# Patient Record
Sex: MALE | Race: WHITE | NOT HISPANIC OR LATINO | URBAN - METROPOLITAN AREA
[De-identification: names, ages, dates, MRNs, and addresses within clinical notes are randomized per-mention and may not be internally consistent; named-entity substitution may affect disease eponyms.]

---

## 2017-07-27 ENCOUNTER — AMBULATORY - HEALTHEAST (OUTPATIENT)
Dept: CARDIAC REHAB | Facility: HOSPITAL | Age: 62
End: 2017-07-27

## 2017-07-27 DIAGNOSIS — Z95.5 STENTED CORONARY ARTERY: ICD-10-CM

## 2017-08-02 ENCOUNTER — AMBULATORY - HEALTHEAST (OUTPATIENT)
Dept: CARDIAC REHAB | Facility: HOSPITAL | Age: 62
End: 2017-08-02

## 2017-08-02 DIAGNOSIS — Z95.5 STENTED CORONARY ARTERY: ICD-10-CM

## 2017-08-02 RX ORDER — ISOSORBIDE MONONITRATE 30 MG/1
30 TABLET, EXTENDED RELEASE ORAL DAILY
Status: SHIPPED | COMMUNITY
Start: 2017-08-02

## 2017-08-02 RX ORDER — NITROGLYCERIN 0.4 MG/1
0.4 TABLET SUBLINGUAL
Status: SHIPPED | COMMUNITY
Start: 2017-08-02

## 2017-08-02 RX ORDER — ATORVASTATIN CALCIUM 80 MG/1
80 TABLET, FILM COATED ORAL DAILY
Status: SHIPPED | COMMUNITY
Start: 2017-08-02

## 2017-08-02 RX ORDER — ASPIRIN 81 MG/1
81 TABLET, CHEWABLE ORAL DAILY
Status: SHIPPED | COMMUNITY
Start: 2017-08-02

## 2017-08-02 RX ORDER — PRASUGREL 10 MG/1
10 TABLET, FILM COATED ORAL DAILY
Status: SHIPPED | COMMUNITY
Start: 2017-08-02

## 2017-08-02 RX ORDER — METOPROLOL SUCCINATE 50 MG/1
50 TABLET, EXTENDED RELEASE ORAL DAILY
Status: SHIPPED | COMMUNITY
Start: 2017-08-02

## 2017-08-02 ASSESSMENT — MIFFLIN-ST. JEOR: SCORE: 1689.83

## 2017-08-07 ENCOUNTER — AMBULATORY - HEALTHEAST (OUTPATIENT)
Dept: CARDIAC REHAB | Facility: HOSPITAL | Age: 62
End: 2017-08-07

## 2017-08-07 DIAGNOSIS — Z95.5 STENTED CORONARY ARTERY: ICD-10-CM

## 2017-08-09 ENCOUNTER — AMBULATORY - HEALTHEAST (OUTPATIENT)
Dept: CARDIAC REHAB | Facility: HOSPITAL | Age: 62
End: 2017-08-09

## 2017-08-09 DIAGNOSIS — Z95.5 STENTED CORONARY ARTERY: ICD-10-CM

## 2017-08-14 ENCOUNTER — AMBULATORY - HEALTHEAST (OUTPATIENT)
Dept: CARDIAC REHAB | Facility: HOSPITAL | Age: 62
End: 2017-08-14

## 2017-08-14 DIAGNOSIS — Z95.5 STENTED CORONARY ARTERY: ICD-10-CM

## 2017-08-16 ENCOUNTER — AMBULATORY - HEALTHEAST (OUTPATIENT)
Dept: CARDIAC REHAB | Facility: HOSPITAL | Age: 62
End: 2017-08-16

## 2017-08-16 DIAGNOSIS — Z95.5 STENTED CORONARY ARTERY: ICD-10-CM

## 2017-08-21 ENCOUNTER — AMBULATORY - HEALTHEAST (OUTPATIENT)
Dept: CARDIAC REHAB | Facility: HOSPITAL | Age: 62
End: 2017-08-21

## 2017-08-21 DIAGNOSIS — Z95.5 STENTED CORONARY ARTERY: ICD-10-CM

## 2017-08-23 ENCOUNTER — AMBULATORY - HEALTHEAST (OUTPATIENT)
Dept: CARDIAC REHAB | Facility: HOSPITAL | Age: 62
End: 2017-08-23

## 2017-08-23 DIAGNOSIS — Z95.5 STENTED CORONARY ARTERY: ICD-10-CM

## 2017-08-28 ENCOUNTER — AMBULATORY - HEALTHEAST (OUTPATIENT)
Dept: CARDIAC REHAB | Facility: HOSPITAL | Age: 62
End: 2017-08-28

## 2017-08-28 DIAGNOSIS — Z95.5 STENTED CORONARY ARTERY: ICD-10-CM

## 2017-08-30 ENCOUNTER — AMBULATORY - HEALTHEAST (OUTPATIENT)
Dept: CARDIAC REHAB | Facility: HOSPITAL | Age: 62
End: 2017-08-30

## 2017-08-30 DIAGNOSIS — Z95.5 STENTED CORONARY ARTERY: ICD-10-CM

## 2017-09-06 ENCOUNTER — AMBULATORY - HEALTHEAST (OUTPATIENT)
Dept: CARDIAC REHAB | Facility: HOSPITAL | Age: 62
End: 2017-09-06

## 2017-09-06 DIAGNOSIS — Z95.5 STENTED CORONARY ARTERY: ICD-10-CM

## 2017-09-11 ENCOUNTER — AMBULATORY - HEALTHEAST (OUTPATIENT)
Dept: CARDIAC REHAB | Facility: HOSPITAL | Age: 62
End: 2017-09-11

## 2017-09-11 DIAGNOSIS — Z95.5 STENTED CORONARY ARTERY: ICD-10-CM

## 2017-09-13 ENCOUNTER — AMBULATORY - HEALTHEAST (OUTPATIENT)
Dept: CARDIAC REHAB | Facility: HOSPITAL | Age: 62
End: 2017-09-13

## 2017-09-13 DIAGNOSIS — Z95.5 STENTED CORONARY ARTERY: ICD-10-CM

## 2017-09-27 ENCOUNTER — AMBULATORY - HEALTHEAST (OUTPATIENT)
Dept: CARDIAC REHAB | Facility: HOSPITAL | Age: 62
End: 2017-09-27

## 2017-09-27 DIAGNOSIS — Z95.5 STENTED CORONARY ARTERY: ICD-10-CM

## 2017-10-02 ENCOUNTER — AMBULATORY - HEALTHEAST (OUTPATIENT)
Dept: CARDIAC REHAB | Facility: HOSPITAL | Age: 62
End: 2017-10-02

## 2017-10-02 DIAGNOSIS — I21.3 STEMI (ST ELEVATION MYOCARDIAL INFARCTION) (H): ICD-10-CM

## 2017-10-04 ENCOUNTER — AMBULATORY - HEALTHEAST (OUTPATIENT)
Dept: CARDIAC REHAB | Facility: HOSPITAL | Age: 62
End: 2017-10-04

## 2017-10-04 DIAGNOSIS — I21.3 STEMI (ST ELEVATION MYOCARDIAL INFARCTION) (H): ICD-10-CM

## 2017-10-04 DIAGNOSIS — Z95.5 STENTED CORONARY ARTERY: ICD-10-CM

## 2017-10-06 ENCOUNTER — AMBULATORY - HEALTHEAST (OUTPATIENT)
Dept: CARDIAC REHAB | Facility: HOSPITAL | Age: 62
End: 2017-10-06

## 2017-10-06 DIAGNOSIS — Z95.5 STENTED CORONARY ARTERY: ICD-10-CM

## 2017-10-06 DIAGNOSIS — I21.3 STEMI (ST ELEVATION MYOCARDIAL INFARCTION) (H): ICD-10-CM

## 2017-10-09 ENCOUNTER — AMBULATORY - HEALTHEAST (OUTPATIENT)
Dept: CARDIAC REHAB | Facility: HOSPITAL | Age: 62
End: 2017-10-09

## 2017-10-09 DIAGNOSIS — Z95.5 STENTED CORONARY ARTERY: ICD-10-CM

## 2017-10-09 DIAGNOSIS — I21.3 STEMI (ST ELEVATION MYOCARDIAL INFARCTION) (H): ICD-10-CM

## 2017-10-11 ENCOUNTER — AMBULATORY - HEALTHEAST (OUTPATIENT)
Dept: CARDIAC REHAB | Facility: HOSPITAL | Age: 62
End: 2017-10-11

## 2017-10-11 DIAGNOSIS — Z95.5 STENTED CORONARY ARTERY: ICD-10-CM

## 2017-10-11 DIAGNOSIS — I21.3 STEMI (ST ELEVATION MYOCARDIAL INFARCTION) (H): ICD-10-CM

## 2017-10-13 ENCOUNTER — AMBULATORY - HEALTHEAST (OUTPATIENT)
Dept: CARDIAC REHAB | Facility: HOSPITAL | Age: 62
End: 2017-10-13

## 2017-10-13 DIAGNOSIS — I21.3 STEMI (ST ELEVATION MYOCARDIAL INFARCTION) (H): ICD-10-CM

## 2017-10-13 DIAGNOSIS — Z95.5 STENTED CORONARY ARTERY: ICD-10-CM

## 2017-10-16 ENCOUNTER — AMBULATORY - HEALTHEAST (OUTPATIENT)
Dept: CARDIAC REHAB | Facility: HOSPITAL | Age: 62
End: 2017-10-16

## 2017-10-16 DIAGNOSIS — Z95.5 STENTED CORONARY ARTERY: ICD-10-CM

## 2017-10-16 DIAGNOSIS — I21.19 ST ELEVATION MYOCARDIAL INFARCTION (STEMI) INVOLVING OTHER CORONARY ARTERY OF INFERIOR WALL (H): ICD-10-CM

## 2017-10-18 ENCOUNTER — AMBULATORY - HEALTHEAST (OUTPATIENT)
Dept: CARDIAC REHAB | Facility: HOSPITAL | Age: 62
End: 2017-10-18

## 2017-10-18 DIAGNOSIS — I21.19 ST ELEVATION MYOCARDIAL INFARCTION (STEMI) INVOLVING OTHER CORONARY ARTERY OF INFERIOR WALL (H): ICD-10-CM

## 2017-10-18 DIAGNOSIS — Z95.5 STENTED CORONARY ARTERY: ICD-10-CM

## 2017-10-20 ENCOUNTER — AMBULATORY - HEALTHEAST (OUTPATIENT)
Dept: CARDIAC REHAB | Facility: HOSPITAL | Age: 62
End: 2017-10-20

## 2017-10-20 DIAGNOSIS — Z95.5 STENTED CORONARY ARTERY: ICD-10-CM

## 2017-10-20 DIAGNOSIS — I21.19 ST ELEVATION MYOCARDIAL INFARCTION (STEMI) INVOLVING OTHER CORONARY ARTERY OF INFERIOR WALL (H): ICD-10-CM

## 2017-10-23 ENCOUNTER — AMBULATORY - HEALTHEAST (OUTPATIENT)
Dept: CARDIAC REHAB | Facility: HOSPITAL | Age: 62
End: 2017-10-23

## 2017-10-23 DIAGNOSIS — I21.19 ST ELEVATION MYOCARDIAL INFARCTION (STEMI) INVOLVING OTHER CORONARY ARTERY OF INFERIOR WALL (H): ICD-10-CM

## 2017-10-25 ENCOUNTER — AMBULATORY - HEALTHEAST (OUTPATIENT)
Dept: CARDIAC REHAB | Facility: HOSPITAL | Age: 62
End: 2017-10-25

## 2017-10-25 DIAGNOSIS — I21.3 STEMI (ST ELEVATION MYOCARDIAL INFARCTION) (H): ICD-10-CM

## 2017-10-27 ENCOUNTER — AMBULATORY - HEALTHEAST (OUTPATIENT)
Dept: CARDIAC REHAB | Facility: HOSPITAL | Age: 62
End: 2017-10-27

## 2017-10-27 DIAGNOSIS — I21.11 ST ELEVATION MYOCARDIAL INFARCTION INVOLVING RIGHT CORONARY ARTERY (H): ICD-10-CM

## 2017-10-27 DIAGNOSIS — Z95.5 STENTED CORONARY ARTERY: ICD-10-CM

## 2017-10-30 ENCOUNTER — AMBULATORY - HEALTHEAST (OUTPATIENT)
Dept: CARDIAC REHAB | Facility: HOSPITAL | Age: 62
End: 2017-10-30

## 2017-10-30 DIAGNOSIS — I21.11 ST ELEVATION MYOCARDIAL INFARCTION INVOLVING RIGHT CORONARY ARTERY (H): ICD-10-CM

## 2017-10-30 DIAGNOSIS — Z95.5 STENTED CORONARY ARTERY: ICD-10-CM

## 2017-11-01 ENCOUNTER — AMBULATORY - HEALTHEAST (OUTPATIENT)
Dept: CARDIAC REHAB | Facility: HOSPITAL | Age: 62
End: 2017-11-01

## 2017-11-01 DIAGNOSIS — Z95.5 STENTED CORONARY ARTERY: ICD-10-CM

## 2017-11-01 DIAGNOSIS — I21.11 ST ELEVATION MYOCARDIAL INFARCTION INVOLVING RIGHT CORONARY ARTERY (H): ICD-10-CM

## 2017-11-03 ENCOUNTER — AMBULATORY - HEALTHEAST (OUTPATIENT)
Dept: CARDIAC REHAB | Facility: HOSPITAL | Age: 62
End: 2017-11-03

## 2017-11-03 DIAGNOSIS — Z95.5 STENTED CORONARY ARTERY: ICD-10-CM

## 2017-11-03 DIAGNOSIS — I21.11 ST ELEVATION MYOCARDIAL INFARCTION INVOLVING RIGHT CORONARY ARTERY (H): ICD-10-CM

## 2021-05-31 VITALS — BODY MASS INDEX: 24.72 KG/M2 | WEIGHT: 182.25 LBS

## 2021-05-31 VITALS — BODY MASS INDEX: 24.82 KG/M2 | WEIGHT: 183 LBS

## 2021-05-31 VITALS — BODY MASS INDEX: 24.28 KG/M2 | WEIGHT: 179 LBS

## 2021-05-31 VITALS — BODY MASS INDEX: 24.55 KG/M2 | WEIGHT: 181 LBS

## 2021-05-31 VITALS — BODY MASS INDEX: 24.68 KG/M2 | WEIGHT: 182 LBS

## 2021-05-31 VITALS — BODY MASS INDEX: 24.95 KG/M2 | WEIGHT: 184 LBS

## 2021-05-31 VITALS — WEIGHT: 180 LBS | BODY MASS INDEX: 24.41 KG/M2

## 2021-05-31 VITALS — BODY MASS INDEX: 24.39 KG/M2 | WEIGHT: 179.8 LBS

## 2021-05-31 VITALS — HEIGHT: 72 IN | BODY MASS INDEX: 25.73 KG/M2 | WEIGHT: 190 LBS

## 2021-05-31 VITALS — BODY MASS INDEX: 24.41 KG/M2 | WEIGHT: 180 LBS

## 2021-06-12 NOTE — PROGRESS NOTES
ITP ASSESSMENT   Assessment Day: Initial  Session Number: 1  Precautions: Staged Stenting  Diagnosis: Stent  Risk Stratification: High (Staged PCI to RCA)  Referring Provider: Lacho Candelario MD  EXERCISE  Exercise Assessment: Initial     6 Minute Walk Test   Pre   Pre Exercise HR: 74                  Pre Exercise BP: 122/77    Peak  Peak HR: 95                 Peak BP: 141/68  Peak feet: 1600  Peak O2 SAT: 98  Peak RPE: 7  Peak MPH: 3.03    Symptoms:  Peak Symptoms: None    5 mins. Post  5 Min Post HR: 77  5 Min Post BP: 106/79                         Exercise Plan  Goals Next 30 days  ADL'S: Walk 2-3 days/week for 20-30 minutes  Leisure: Start Heart healthy diet  Work: Returned-[employer] business      Education Goals: Medication review;Has system for taking medication.  Education Goals Met: Patient can state cardiac s/s and appropriate emergency response.;Has system for taking medication.    Exercise Prescription  Exercise Mode: Treadmill;Bike;Nustep;Arm Erg.  Frequency: 2 days/week  Duration: 40 minutes  Intensity / THR: 20-30 beats above resting heart rate  RPE 11-14  Progression / Met level: 3.5-3.7  Resistive Training?: Yes    Current Exercise (mins/week): 60    Interventions  Home Exercise:  Mode: Walking, Stationary Bike  Frequency: 3-4 days/week  Duration: 30-60 minutes    Education Material : Educational videos;Provide written material;Individual education and counseling;Offer educational classes    Education Completed  Exercise Education Completed: Cardiac Anatomy;Signs and Symptoms;Medication review;RPE;Emergency Plan;Home Exercise;Warm up/cool down;FITT Principles;BP/HR Reponse to exercise;Benefits of Exercise            Exercise Follow-up/Discharge  Follow up/Discharge: Encouraged pt to start walking and join gym NUTRITION  Nutrition Assessment: Initial    Nutrition Risk Factors:  Nutrition Risk Factors: Dyslipidemia  Cholesterol: 194  LDL: 127  HDL: 45  Triglycerides: 109    Nutrition  Plan  Interventions  Nutrition Interventions: Diet consult;Therapist/Patient discussion;Diet class;Educational videos;Provide with written material      Education Completed  Nutrition Education Completed: Risk factor overview    Goals  Nutrition Goals (Next 30 days): Patient can identify their risk factors for CAD;Patient will follow a low sodium diet;Patient will follow a low saturated fat diet;Patient knows appropriate portion size    Goals Met  Nutrition Goals Met: Patient can identify their risk factors for CAD;Provided Rate your Plate Survey;Reviewed Dietitian schedule    Height, Weight, and  BMI  Weight: 190 lb (86.2 kg)  Height: 6' (1.829 m)  BMI: 25.76    Nutrition Follow-up  Follow-up/Discharge: Pt very motivated to change diet       Other Risk Factors  Other Risk Factor Assessment: Initial    HTN Risk Factor: Hypertension    Pre Exercise BP: 122/77  Post Exercise BP: 106/79    Hypertension Plan  Goals  HTN Goals: Follow low sodium diet;Take medication as prescribed;Exercises regularly    Goals Met  HTN Goals Met: Take medication as prescribed;Follow low sodium diet    HTN Interventions  HTN Interventions: Diet consult;Therapist/patient discussion;Provide written material;Offer educational videos;Offer educational classes    HTN Education Completed  HTN Education Completed: Risk factor overview    Tobacco Risk Factor: NA    Risk Factor Follow-up   Follow-up/Discharge: Encouraged pt to meet with dietician   PSYCHOSOCIAL  Psychosocial Assessment: Initial     [facility] Q of L Summary Score: 10    AGUSTINA-D Score: 2    Psychosocial Risk Factor: Stress    Psychosocial Plan  Interventions  If AGUSTINA-D > 15 send letter to MD  Interventions: Offer educational videos and classes;Provide written material;Individual education and counseling    Education Completed  Education Completed: Effects of stress on body    Goals  Goals (Next 30 days): Identified Support system;Identify stressors;Practicing stress management  skills    Goals Met  Goals Met: Identified Support system;Identify stressors    Psychosocial Follow-up  Follow-up/Discharge: 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

## 2021-06-12 NOTE — PROGRESS NOTES
Dominik Kirby has participated in 11 sessions of Phase II Cardiac Rehab.    Progress Report:   Cardiac Rehab Treatment Progress Report 8/23/2017 8/28/2017 8/30/2017 9/6/2017 9/11/2017   Weight 188 lbs 187 lbs 186 lbs 186 lbs 186 lbs   Pre Exercise  HR 66 71 70 64 68   Pre Exercise /64 124/70 116/68 128/56 138/70   Treadmill Peak  97 105 105 103   Nustep Peak Heart Rate 104 93 106 104 -   Nustep Peak Blood Pressure 144/66 156/66 174/80 164/80 -   Heart Rate 76 80 82 74 72   Post Exercise /64 120/76 110/68 120/64 120/60   ECG SR/BBB SR/BBB SR/BBB SR/BBB SR/BBB   Total Exercise Minutes 45 50 40 40 40         Current Status:  Currently exercising without complaints or symptoms.  Brief burst of tachy rhythm noted with exercise on 9/6/17.    If Physician recommends change in treatment plan, please place orders.        __________________________________________________      _____________  Signature                                                                                                  DateSee Doc Flowsheet

## 2021-06-12 NOTE — PROGRESS NOTES
Cardiac Rehab  Phase II Assessment    Assessment Date: 8/2/17      Procedure: PCI NATHAN X5 to LAD and Cx, Staged procedure planned for RCA  Date of Onset: 7/24/17, 7/25/17  ICD/Pacemaker: No   Post-op Complications: None  ECG History: Sinus Rhythm, LBBB EF%:55-60%  Past Medical History:   Patient Active Problem List   Diagnosis     Cholelithiasis with cholecystitis     Essential hypertension     Mixed hyperlipidemia     LBBB (left bundle branch block)         Physical Assessment  Precautions/ Physical Limitations: None  Oxygen: No  O2 Sats: 97-98% on RA Lung Sounds: Clear Edema: None  Sleeping Pattern: good   Appetite: good   Nutrition Risk Screen: Will follow up with dietician    Pain  Location: NA  Characteristics:NA  Intensity: (0-10 scale) 0  Current Pain Management: NA  Intervention: NA  Response: NA    Psychosocial/ Emotional Health  1. In the past 12 months, have you been in a relationship where you have been abused physically, emotionally, sexually or financially? No  notified: No  2. Who do you turn to for emotional support?: Wife, Dog Richards  3. Do you have cultural or spiritual needs? No  4. Have there been any major life changes in the past 12 months? Yes Recent diagnosis    Referral Information  Primary Physician: Lacho Candelario MD  Cardiologist: Following up with PA today, Will decide   Surgeon/Interventionalist: Dr. Astorga    Home exercise/Equipment: Stationary bike    Patient's long-term goal(s): Change diet, Exercise regularly    1. Living Accommodations: Home Steps: No      Support people at home: Wife, Daughter   2. Marital Status:   3. Family is able to assist with cares      [place]/Community involvement: [place]  4. Recreation/Hobbies: Work, Yard work

## 2021-06-12 NOTE — PROGRESS NOTES
ITP ASSESSMENT   Assessment Day: 30 Day  Session Number: 8  Precautions: Staged PCI  Diagnosis: Stent  Risk Stratification: High  Referring Provider: Lacho Candelario MD  EXERCISE  Exercise Assessment: Reassessment                         Exercise Plan  Goals Next 30 days  ADL'S: Bike 2-3 days/week  Leisure: Trim trees, [place] leaves  Work: Returned-Owns business    Education Goals: All goals in this section met;Medication review;Has system for taking medication.;Patient can state cardiac s/s and appropriate emergency response.  Education Goals Met: Patient can state cardiac s/s and appropriate emergency response.;Has system for taking medication.;Medication review.                        Goals Met  Initial ADL's goals met: Walked 2-3 days/week  Initial Leisure goals met: Started Heart healthy diet  Intial Work goals met: Returned  Initial Progression: All Goals met    Exercise Prescription  Exercise Mode: Treadmill;Bike;Nustep;Arm Erg.  Frequency: 2days/week  Duration: 40 minutes  Intensity / THR: 20-30 beats above resting heart rate  RPE 11-14  Progression / Met level: 4.5  Resistive Training?: No    Current Exercise (mins/week): 170    Interventions  Home Exercise:  Mode: Walking/Biking  Frequency: 3-4 days/week  Duration: 30-60 minutes    Education Material : Educational videos;Provide written material;Individual education and counseling;Offer educational classes    Education Completed  Exercise Education Completed: Cardiac Anatomy;Signs and Symptoms;Medication review;RPE;Emergency Plan;Home Exercise;Warm up/cool down;FITT Principles;BP/HR Reponse to exercise;Benefits of Exercise            Exercise Follow-up/Discharge  Follow up/Discharge: Plans to start biking along with walking         NUTRITION  Nutrition Assessment: Reassessment    Nutrition Risk Factors:  Nutrition Risk Factors: Dyslipidemia  Cholesterol: 194  LDL: 127  HDL: 45  Triglycerides: 109    Nutrition Plan  Interventions  Nutrition Interventions:  Diet consult;Diet class;Therapist/Patient discussion;Educational videos;Provide with written material  Rate Your Plate Score: 44    Education Completed  Nutrition Education Completed: Low Saturated fat diet;Low sodium diet    Goals  Nutrition Goals (Next 30 days): Patient can identify their risk factors for CAD;Patient will follow a low sodium diet;Patient knows appropriate portion size;Patient will follow a low saturated fat diet    Goals Met  Nutrition Goals Met: Patient can identify their risk factors for CAD;Patient follows a low sodium diet;Completed Nutritional Risk Screen;Patient states following a low saturated fat diet;Patient knows appropriate portion size    Height, Weight, and  BMI  Weight: 187 lb (84.8 kg)  Height: 6' (1.829 m)  BMI: 25.36    Nutrition Follow-up  Follow-up/Discharge: Started heart healthy diet, plans to continue     Other Risk Factors  Other Risk Factor Assessment: Reassessment    HTN Risk Factor: Hypertension    Pre Exercise BP: 124/70  Post Exercise BP: 126/64    Hypertension Plan  Goals  HTN Goals: Follow low sodium diet;Take medication as prescribed;Exercises regularly    Goals Met  HTN Goals Met: Exercises regularly;Take medication as prescribed;Follow low sodium diet    HTN Interventions  HTN Interventions: Diet consult;Therapist/patient discussion;Offer educational videos;Provide written material;Offer educational classes    HTN Education Completed  HTN Education Completed: Medication review;Risk factor overview;Low sodium diet;Low sodium class    Tobacco Risk Factor: NA    Risk Factor Follow-up   Follow-up/Discharge: Plans to continue exercise and heart healthy diet       PSYCHOSOCIAL  Psychosocial Assessment: Reassessment     [facility] Q of L Summary Score: 10    AGUSTINA-D Score: 2    Psychosocial Risk Factor: Stress    Psychosocial Plan  Interventions  Interventions: Offer educational videos and classes;Provide written material;Individual education and counseling    Education  Completed  Education Completed: Effects of stress on body    Goals  Goals (Next 30 days): Identified Support system;Identify stressors;Practicing stress management skills    Goals Met  Goals Met: Identified Support system;Identify stressors;Practicing stress management skills    Psychosocial Follow-up  Follow-up/Discharge: States normal life stress         Patient involved in Goal setting?: Yes    Signature: _____________________________________________________________    Date: __________________    Time: __________________

## 2021-06-13 NOTE — PROGRESS NOTES
ITP ASSESSMENT   Assessment Day: 90 Day  Session Number: 24  Precautions: Staged PCI/  Complicated with an MI  Diagnosis: Stent  Risk Stratification: High  Referring Provider: Lacho Candelario MD  EXERCISE  Exercise Assessment: Reassessment                              Exercise Plan  Goals Next 30 days  ADL'S: Pt tolerating all home activities well, all goals met  Leisure: Pt biking 2x/week consistently, exercise goal met  Work: Pt tolerating work well, goal met    Education Goals: All goals in this section met  Education Goals Met: Patient can state cardiac s/s and appropriate emergency response.;Has system for taking medication.;Medication review.                        Goals Met  60 day ADL'S goals met: Pt tolerating yardwork well with no fatigue/symptoms  60 day Leisure goals met: Pt biking 2x week, attending CR 3x/week consistently  60 day Work goals met: Pt tolerating work well without fatigue  60 Day Progression: Pt has reached 5.3 MET level.    30 day ADL'S goals met: Pt is not consistent  with         home biking  30 day Leisure goals met: Very minimal trimming of trees,  still fatigues easily  30 day Work goals met: Has resumed FT work  30 Day Progression: Making progress;  Had a staged intervention and  Inferior Infarct 9/14/17    Initial ADL's goals met: Walked 2-3 days/week  Initial Leisure goals met: Started Heart healthy diet  Intial Work goals met: Returned  Initial Progression: All Goals met    Exercise Prescription  Exercise Mode: Treadmill;Bike;Nustep  Frequency: 3x/week  Duration: 40  Intensity / THR: 20-30 beats above resting heart rate  RPE 11-14  Progression / Met level: 5.4-6  Resistive Training?: No    Current Exercise (mins/week): 180    Interventions  Home Exercise:  Mode: bike  Frequency: 2x/week  Duration: 20-30 min    Education Material : Educational videos;Provide written material;Individual education and counseling;Offer educational classes    Education Completed  Exercise  Education Completed: Cardiac Anatomy;Signs and Symptoms;Medication review;RPE;Emergency Plan;Home Exercise;Warm up/cool down;FITT Principles;BP/HR Reponse to exercise;Benefits of Exercise            Exercise Follow-up/Discharge  Follow up/Discharge: Pt riding bike 2x/week consistently NUTRITION  Nutrition Assessment: Reassessment    Nutrition Risk Factors:  Nutrition Risk Factors: Dyslipidemia    Nutrition Plan  Interventions  Nutrition Interventions: Diet consult;Diet class;Therapist/Patient discussion;Educational videos;Provide with written material  Rate Your Plate Score: 44    Education Completed  Nutrition Education Completed: Low Saturated fat diet;Low sodium diet    Goals  Nutrition Goals (Next 30 days): Patient demonstrated understanding of cardiac nutrition, no goals identified for the next 30 days    Goals Met  Nutrition Goals Met: Patient can identify their risk factors for CAD;Patient follows a low sodium diet;Patient states following a low saturated fat diet;Patient knows appropriate portion size    Height, Weight, and  BMI  Weight: 179 lb (81.2 kg)  Height: 6' (1.829 m)  BMI: 24.27    Nutrition Follow-up  Follow-up/Discharge: Started heart healthy diet, plans to continue       Other Risk Factors  Other Risk Factor Assessment: Reassessment    HTN Risk Factor: Hypertension    Pre Exercise BP: 118/72  Post Exercise BP: 100/48    Hypertension Plan  Goals  HTN Goals: Patient demonstrates understanding of HTN, no goals identified for the next 30 days    Goals Met  HTN Goals Met: Exercises regularly;Take medication as prescribed;Follow low sodium diet    HTN Interventions  HTN Interventions: Diet consult;Therapist/patient discussion;Offer educational videos;Provide written material;Offer educational classes    HTN Education Completed  HTN Education Completed: Medication review;Risk factor overview;Low sodium diet;Low sodium class    Tobacco Risk Factor: NA     PSYCHOSOCIAL  Psychosocial Assessment:  Reassessment     Psychosocial Risk Factor: Stress    Psychosocial Plan  Interventions  Interventions: Offer educational videos and classes;Provide written material;Individual education and counseling    Education Completed  Education Completed: Effects of stress on body;Relaxation/Coping Techniques    Goals  Goals (Next 30 days): Improvement in [employer] score    Goals Met  Goals Met: Identify stressors;Practicing stress management skills    Psychosocial Follow-up  Follow-up/Discharge: Reports he walks at work to decrease stress.. Offered stress / Relaxation class           Patient involved in Goal setting?: Yes    Signature: _____________________________________________________________    Date: __________________    Time: __________________

## 2021-06-13 NOTE — PROGRESS NOTES
ITP ASSESSMENT   Assessment Day: 120 Day  Session Number: 27  Precautions: PCI complicated by MI/Cardiogenic shock  Diagnosis: Stent  Risk Stratification: High  Referring Provider: Lacho Candelario MD  EXERCISE  Exercise Assessment: Discharge     6 Minute Walk Test   Pre   Pre Exercise HR: 74                  Pre Exercise BP: 132/80    Peak  Peak HR: 138                 Peak BP: 158/75  Peak feet: 2000  Peak O2 SAT: 100  Peak RPE: 13  Peak MPH: 3.79    Symptoms:  Peak Symptoms: mild SOB    5 mins. Post  5 Min Post HR: 83  5 Min Post BP: 138/77                         Exercise Plan  Education Goals Met: Patient can state cardiac s/s and appropriate emergency response.;Has system for taking medication.;Medication review.    60 day ADL'S goals met: Pt tolerating yardwork well with no fatigue/symptoms  60 day Leisure goals met: Pt biking 2x week, attending CR 3x/week consistently  60 day Work goals met: Pt tolerating work well without fatigue  60 Day Progression: Pt has reached 5.3 MET level.    30 day ADL'S goals met: Pt is not consistent  with         home biking  30 day Leisure goals met: Very minimal trimming of trees,  still fatigues easily  30 day Work goals met: Has resumed FT work  30 Day Progression: Making progress;  Had a staged intervention and  Inferior Infarct 9/14/17    Initial ADL's goals met: Walked 2-3 days/week  Initial Leisure goals met: Started Heart healthy diet  Intial Work goals met: Returned  Initial Progression: All Goals met    Current Exercise (mins/week): 200    Interventions  Home Exercise:  Mode: Bike/walk  Frequency: 2x/week  Duration: 30-60'    Education Material : Educational videos;Provide written material;Individual education and counseling;Offer educational classes    Education Completed  Exercise Education Completed: Cardiac Anatomy;Signs and Symptoms;Medication review;RPE;Emergency Plan;Home Exercise;Warm up/cool down;FITT Principles;BP/HR Reponse to exercise;Stretching;Strength  training;End point of exercise;Benefits of Exercise            Exercise Follow-up/Discharge  Follow up/Discharge: Pt has joined [employer], has an orientation appointment next week. Reviewed home program, RPE, THRR, s/s of exercise intolerance, and emergency plan NUTRITION  Nutrition Assessment: Discharge    Nutrition Risk Factors:  Nutrition Risk Factors: Dyslipidemia  Cholesterol: 194  LDL: 127  HDL: 45  Triglycerides: 109    Nutrition Plan  Interventions  Nutrition Interventions: Diet consult;Diet class;Therapist/Patient discussion;Educational videos;Provide with written material  Rate Your Plate Score: 44    Education Completed  Nutrition Education Completed: Low Saturated fat diet;Risk factor overview;Low sodium diet;Weight management    Goals Met  Nutrition Goals Met: Patient can identify their risk factors for CAD;Patient follows a low sodium diet;Completed Nutritional Risk Screen;Provided Rate your Plate Survey;Reviewed Dietitian schedule;Patient states following a low saturated fat diet;Patient knows appropriate portion size    Height, Weight, and  BMI  Weight: 182 lb (82.6 kg)  Height: 6' (1.829 m)  BMI: 24.68    Nutrition Follow-up  Follow-up/Discharge: Pt is following a heart healthy diet       Other Risk Factors  Other Risk Factor Assessment: Discharge    HTN Risk Factor: Hypertension    Pre Exercise BP: 100/66  Post Exercise BP: 118/70    Hypertension Plan  Goals Met  HTN Goals Met: Follow low sodium diet;Take medication as prescribed;Exercises regularly    HTN Interventions  HTN Interventions: Diet consult;Therapist/patient discussion;Provide written material;Offer educational videos;Offer educational classes    HTN Education Completed  HTN Education Completed: Low sodium diet;Medication review;Risk factor overview    Tobacco Risk Factor: NA    Risk Factor Follow-up   Follow-up/Discharge: Reviewed all risk factors and plan for risk mgmt   PSYCHOSOCIAL  Psychosocial Assessment: Discharge      [facility] Q of L Summary Score: 10    AGUSTINA-D Score: 1    Psychosocial Risk Factor: Stress    Psychosocial Plan  Interventions  Interventions: Offer Spiritual Care consult;Offer educational videos and classes;Provide written material;Individual education and counseling    Education Completed  Education Completed: Relaxation/Coping Techniques;Effects of stress on body    Goals Met  Goals Met: Identified Support system;Oriented to stress management classes;Identify stressors;Improvement in [facility] score;Practicing stress management skills    Psychosocial Follow-up  Follow-up/Discharge: Reviewed importance of stress mgmt and effects of stress on the heart     Patient involved in Goal setting?: Yes    Signature: _____________________________________________________________    Date: __________________    Time: __________________

## 2021-06-13 NOTE — PROGRESS NOTES
ITP ASSESSMENT   Assessment Day: 60 Day  Session Number: 14  Precautions: Staged PCI/  Complicated with an MI  Diagnosis: Stent  Risk Stratification: High  Referring Provider: Lacho Candelario MD  EXERCISE  Exercise Assessment: Reassessment     Tolerates 40' of ex. At 3.5-4.5 Mets                         Exercise Plan  Goals Next 30 days  ADL'S: Tolerate fall yard clean up  Leisure: Consistent Home walking 2-3 days per week  Work: Tolerate work with out fatigue      Education Goals: All goals in this section met;Medication review;Has system for taking medication.;Patient can state cardiac s/s and appropriate emergency response.  Education Goals Met: Patient can state cardiac s/s and appropriate emergency response.;Has system for taking medication.;Medication review.                        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    Initial ADL's goals met: Walked 2-3 days/week  Initial Leisure goals met: Started Heart healthy diet  Intial Work goals met: Returned  Initial Progression: All Goals met    Exercise Prescription  Exercise Mode: Treadmill;Bike;Nustep  Frequency: 2 days per week  Duration: 40  Intensity / THR: 20-30 beats above resting heart rate  RPE 11-14  Progression / Met level: 3.5-4.8  Resistive Training?: No    Current Exercise (mins/week): 100    Interventions  Home Exercise:  Mode: Walk  Frequency: 2-3 days per week  Duration: 30-45'    Education Material : Educational videos;Provide written material;Individual education and counseling;Offer educational classes    Education Completed  Exercise Education Completed: Cardiac Anatomy;Signs and Symptoms;Medication review;RPE;Emergency Plan;Home Exercise;Warm up/cool down;FITT Principles;BP/HR  Reponse to exercise;Benefits of Exercise            Exercise Follow-up/Discharge  Follow up/Discharge: Encouraged consistent home walking NUTRITION  Nutrition Assessment: Reassessment    Nutrition Risk Factors:  Nutrition Risk Factors: Dyslipidemia    Nutrition Plan  Interventions  Nutrition Interventions: Diet consult;Diet class;Therapist/Patient discussion;Educational videos;Provide with written material  Rate Your Plate Score: 44    Education Completed  Nutrition Education Completed: Low Saturated fat diet;Low sodium diet    Goals  Nutrition Goals (Next 30 days): Patient can identify their risk factors for CAD;Patient will follow a low sodium diet;Patient knows appropriate portion size;Patient will follow a low saturated fat diet    Goals Met  Nutrition Goals Met: Patient can identify their risk factors for CAD;Patient follows a low sodium diet;Completed Nutritional Risk Screen;Patient states following a low saturated fat diet;Patient knows appropriate portion size    Height, Weight, and  BMI  Weight: 179 lb (81.2 kg) (reports that weight loss is not due to loss of appetite)  Height: 6' (1.829 m)    Nutrition Follow-up  Follow-up/Discharge: Started heart healthy diet, plans to continue       Other Risk Factors  Other Risk Factor Assessment: Reassessment    HTN Risk Factor: Hypertension    Pre Exercise BP: 110/60  Post Exercise BP: 102/60    Hypertension Plan  Goals  HTN Goals: Follow low sodium diet;Take medication as prescribed;Exercises regularly    Goals Met  HTN Goals Met: Exercises regularly;Take medication as prescribed;Follow low sodium diet    HTN Interventions  HTN Interventions: Diet consult;Therapist/patient discussion;Offer educational videos;Provide written material;Offer educational classes    HTN Education Completed  HTN Education Completed: Medication review;Risk factor overview;Low sodium diet;Low sodium class    Tobacco Risk Factor: NA       PSYCHOSOCIAL  Psychosocial Assessment: Reassessment        Psychosocial Risk Factor: Stress    Psychosocial Plan  Interventions  Interventions: Offer educational videos and classes;Provide written material;Individual education and counseling    Education Completed  Education Completed: Effects of stress on body    Goals  Goals (Next 30 days): Identified Support system;Identify stressors;Practicing stress management skills    Goals Met  Goals Met: Identified Support system;Identify stressors;Practicing stress management skills    Psychosocial Follow-up  Follow-up/Discharge: Reports he walks at work to decrease stress.. Offered stress / Relaxation class           Patient involved in Goal setting?: Yes    Signature: _____________________________________________________________    Date: __________________    Time: ________________

## 2021-06-16 PROBLEM — I44.7 LBBB (LEFT BUNDLE BRANCH BLOCK): Status: ACTIVE | Noted: 2017-08-02

## 2021-06-16 PROBLEM — K80.10 CHOLELITHIASIS WITH CHOLECYSTITIS: Status: ACTIVE | Noted: 2017-08-02

## 2021-06-16 PROBLEM — E78.2 MIXED HYPERLIPIDEMIA: Status: ACTIVE | Noted: 2017-08-02

## 2021-06-16 PROBLEM — I10 ESSENTIAL HYPERTENSION: Status: ACTIVE | Noted: 2017-08-02
